# Patient Record
Sex: MALE | Race: WHITE
[De-identification: names, ages, dates, MRNs, and addresses within clinical notes are randomized per-mention and may not be internally consistent; named-entity substitution may affect disease eponyms.]

---

## 2021-05-10 ENCOUNTER — HOSPITAL ENCOUNTER (INPATIENT)
Dept: HOSPITAL 96 - M.ERS | Age: 76
LOS: 3 days | Discharge: HOME | DRG: 247 | End: 2021-05-13
Attending: INTERNAL MEDICINE | Admitting: INTERNAL MEDICINE
Payer: MEDICARE

## 2021-05-10 VITALS — DIASTOLIC BLOOD PRESSURE: 83 MMHG | SYSTOLIC BLOOD PRESSURE: 175 MMHG

## 2021-05-10 VITALS — BODY MASS INDEX: 32.93 KG/M2 | HEIGHT: 70 IN | WEIGHT: 230 LBS

## 2021-05-10 VITALS — DIASTOLIC BLOOD PRESSURE: 102 MMHG | SYSTOLIC BLOOD PRESSURE: 211 MMHG

## 2021-05-10 VITALS — SYSTOLIC BLOOD PRESSURE: 190 MMHG | DIASTOLIC BLOOD PRESSURE: 91 MMHG

## 2021-05-10 VITALS — DIASTOLIC BLOOD PRESSURE: 110 MMHG | SYSTOLIC BLOOD PRESSURE: 200 MMHG

## 2021-05-10 VITALS — DIASTOLIC BLOOD PRESSURE: 88 MMHG | SYSTOLIC BLOOD PRESSURE: 163 MMHG

## 2021-05-10 VITALS — DIASTOLIC BLOOD PRESSURE: 93 MMHG | SYSTOLIC BLOOD PRESSURE: 162 MMHG

## 2021-05-10 VITALS — SYSTOLIC BLOOD PRESSURE: 188 MMHG | DIASTOLIC BLOOD PRESSURE: 98 MMHG

## 2021-05-10 VITALS — DIASTOLIC BLOOD PRESSURE: 94 MMHG | SYSTOLIC BLOOD PRESSURE: 184 MMHG

## 2021-05-10 DIAGNOSIS — Z86.73: ICD-10-CM

## 2021-05-10 DIAGNOSIS — I10: ICD-10-CM

## 2021-05-10 DIAGNOSIS — Z82.49: ICD-10-CM

## 2021-05-10 DIAGNOSIS — F17.210: ICD-10-CM

## 2021-05-10 DIAGNOSIS — Z88.1: ICD-10-CM

## 2021-05-10 DIAGNOSIS — Z95.5: ICD-10-CM

## 2021-05-10 DIAGNOSIS — I73.9: ICD-10-CM

## 2021-05-10 DIAGNOSIS — I21.11: Primary | ICD-10-CM

## 2021-05-10 DIAGNOSIS — Z88.0: ICD-10-CM

## 2021-05-10 DIAGNOSIS — J45.909: ICD-10-CM

## 2021-05-10 DIAGNOSIS — F43.10: ICD-10-CM

## 2021-05-10 DIAGNOSIS — I95.9: ICD-10-CM

## 2021-05-10 DIAGNOSIS — Z95.820: ICD-10-CM

## 2021-05-10 LAB
ABSOLUTE MONOCYTES: 0.4 THOU/UL (ref 0–1.2)
ALBUMIN SERPL-MCNC: 4.1 G/DL (ref 3.4–5)
ALP SERPL-CCNC: 110 U/L (ref 46–116)
ALT SERPL-CCNC: 38 U/L (ref 30–65)
ANION GAP SERPL CALC-SCNC: 9 MMOL/L (ref 7–16)
APTT BLD: 31.3 SECONDS (ref 25–31.3)
AST SERPL-CCNC: 55 U/L (ref 15–37)
BILIRUB SERPL-MCNC: 0.7 MG/DL
BUN SERPL-MCNC: 8 MG/DL (ref 7–18)
CALCIUM SERPL-MCNC: 10 MG/DL (ref 8.5–10.1)
CHLORIDE SERPL-SCNC: 96 MMOL/L (ref 98–107)
CO2 SERPL-SCNC: 29 MMOL/L (ref 21–32)
CREAT SERPL-MCNC: 1 MG/DL (ref 0.6–1.3)
GLUCOSE SERPL-MCNC: 160 MG/DL (ref 70–99)
GRANULOCYTES NFR BLD MANUAL: 89 %
HCT VFR BLD CALC: 52.9 % (ref 42–52)
HGB BLD-MCNC: 18.3 GM/DL (ref 14–18)
INR PPP: 1
LIPASE: 133 U/L (ref 73–393)
LYMPHOCYTES # BLD: 1 THOU/UL (ref 0.8–5.3)
LYMPHOCYTES NFR BLD AUTO: 8 %
MAGNESIUM SERPL-MCNC: 1.7 MG/DL (ref 1.8–2.4)
MCH RBC QN AUTO: 34.2 PG (ref 26–34)
MCHC RBC AUTO-ENTMCNC: 34.5 G/DL (ref 28–37)
MCV RBC: 99.2 FL (ref 80–100)
MONOCYTES NFR BLD: 3 %
MPV: 10 FL. (ref 7.2–11.1)
NEUTROPHILS # BLD: 11 THOU/UL (ref 1.6–8.1)
NT-PRO BRAIN NAT PEPTIDE: 2624 PG/ML (ref ?–300)
NUCLEATED RBCS: 0 /100WBC
PLATELET # BLD EST: ADEQUATE 10*3/UL
PLATELET COUNT*: 257 THOU/UL (ref 150–400)
POTASSIUM SERPL-SCNC: 4 MMOL/L (ref 3.5–5.1)
PROT SERPL-MCNC: 8.1 G/DL (ref 6.4–8.2)
PROTHROMBIN TIME: 10.9 SECONDS (ref 9.2–11.5)
RBC # BLD AUTO: 5.33 MIL/UL (ref 4.5–6)
RBC MORPH BLD: NORMAL
RDW-CV: 13.2 % (ref 10.5–14.5)
SODIUM SERPL-SCNC: 134 MMOL/L (ref 136–145)
WBC # BLD AUTO: 12.4 THOU/UL (ref 4–11)

## 2021-05-10 PROCEDURE — B215YZZ FLUOROSCOPY OF LEFT HEART USING OTHER CONTRAST: ICD-10-PCS | Performed by: INTERNAL MEDICINE

## 2021-05-10 PROCEDURE — B41FYZZ FLUOROSCOPY OF RIGHT LOWER EXTREMITY ARTERIES USING OTHER CONTRAST: ICD-10-PCS | Performed by: INTERNAL MEDICINE

## 2021-05-10 PROCEDURE — B211YZZ FLUOROSCOPY OF MULTIPLE CORONARY ARTERIES USING OTHER CONTRAST: ICD-10-PCS | Performed by: INTERNAL MEDICINE

## 2021-05-10 PROCEDURE — 027034Z DILATION OF CORONARY ARTERY, ONE ARTERY WITH DRUG-ELUTING INTRALUMINAL DEVICE, PERCUTANEOUS APPROACH: ICD-10-PCS | Performed by: INTERNAL MEDICINE

## 2021-05-10 PROCEDURE — 4A023N7 MEASUREMENT OF CARDIAC SAMPLING AND PRESSURE, LEFT HEART, PERCUTANEOUS APPROACH: ICD-10-PCS | Performed by: INTERNAL MEDICINE

## 2021-05-11 VITALS — DIASTOLIC BLOOD PRESSURE: 54 MMHG | SYSTOLIC BLOOD PRESSURE: 138 MMHG

## 2021-05-11 VITALS — DIASTOLIC BLOOD PRESSURE: 41 MMHG | SYSTOLIC BLOOD PRESSURE: 89 MMHG

## 2021-05-11 VITALS — DIASTOLIC BLOOD PRESSURE: 65 MMHG | SYSTOLIC BLOOD PRESSURE: 133 MMHG

## 2021-05-11 VITALS — SYSTOLIC BLOOD PRESSURE: 85 MMHG | DIASTOLIC BLOOD PRESSURE: 48 MMHG

## 2021-05-11 VITALS — SYSTOLIC BLOOD PRESSURE: 132 MMHG | DIASTOLIC BLOOD PRESSURE: 55 MMHG

## 2021-05-11 VITALS — SYSTOLIC BLOOD PRESSURE: 121 MMHG | DIASTOLIC BLOOD PRESSURE: 56 MMHG

## 2021-05-11 VITALS — SYSTOLIC BLOOD PRESSURE: 133 MMHG | DIASTOLIC BLOOD PRESSURE: 78 MMHG

## 2021-05-11 VITALS — DIASTOLIC BLOOD PRESSURE: 80 MMHG | SYSTOLIC BLOOD PRESSURE: 131 MMHG

## 2021-05-11 VITALS — SYSTOLIC BLOOD PRESSURE: 84 MMHG | DIASTOLIC BLOOD PRESSURE: 45 MMHG

## 2021-05-11 VITALS — SYSTOLIC BLOOD PRESSURE: 100 MMHG | DIASTOLIC BLOOD PRESSURE: 54 MMHG

## 2021-05-11 VITALS — SYSTOLIC BLOOD PRESSURE: 132 MMHG | DIASTOLIC BLOOD PRESSURE: 58 MMHG

## 2021-05-11 VITALS — SYSTOLIC BLOOD PRESSURE: 99 MMHG | DIASTOLIC BLOOD PRESSURE: 46 MMHG

## 2021-05-11 VITALS — DIASTOLIC BLOOD PRESSURE: 57 MMHG | SYSTOLIC BLOOD PRESSURE: 121 MMHG

## 2021-05-11 VITALS — SYSTOLIC BLOOD PRESSURE: 90 MMHG | DIASTOLIC BLOOD PRESSURE: 48 MMHG

## 2021-05-11 VITALS — SYSTOLIC BLOOD PRESSURE: 105 MMHG | DIASTOLIC BLOOD PRESSURE: 52 MMHG

## 2021-05-11 VITALS — SYSTOLIC BLOOD PRESSURE: 82 MMHG | DIASTOLIC BLOOD PRESSURE: 46 MMHG

## 2021-05-11 VITALS — DIASTOLIC BLOOD PRESSURE: 53 MMHG | SYSTOLIC BLOOD PRESSURE: 117 MMHG

## 2021-05-11 VITALS — DIASTOLIC BLOOD PRESSURE: 39 MMHG | SYSTOLIC BLOOD PRESSURE: 99 MMHG

## 2021-05-11 VITALS — SYSTOLIC BLOOD PRESSURE: 126 MMHG | DIASTOLIC BLOOD PRESSURE: 73 MMHG

## 2021-05-11 VITALS — SYSTOLIC BLOOD PRESSURE: 123 MMHG | DIASTOLIC BLOOD PRESSURE: 54 MMHG

## 2021-05-11 VITALS — DIASTOLIC BLOOD PRESSURE: 57 MMHG | SYSTOLIC BLOOD PRESSURE: 112 MMHG

## 2021-05-11 VITALS — SYSTOLIC BLOOD PRESSURE: 116 MMHG | DIASTOLIC BLOOD PRESSURE: 51 MMHG

## 2021-05-11 VITALS — SYSTOLIC BLOOD PRESSURE: 127 MMHG | DIASTOLIC BLOOD PRESSURE: 64 MMHG

## 2021-05-11 VITALS — SYSTOLIC BLOOD PRESSURE: 90 MMHG | DIASTOLIC BLOOD PRESSURE: 45 MMHG

## 2021-05-11 VITALS — SYSTOLIC BLOOD PRESSURE: 87 MMHG | DIASTOLIC BLOOD PRESSURE: 40 MMHG

## 2021-05-11 VITALS — SYSTOLIC BLOOD PRESSURE: 115 MMHG | DIASTOLIC BLOOD PRESSURE: 54 MMHG

## 2021-05-11 VITALS — DIASTOLIC BLOOD PRESSURE: 44 MMHG | SYSTOLIC BLOOD PRESSURE: 83 MMHG

## 2021-05-11 VITALS — SYSTOLIC BLOOD PRESSURE: 106 MMHG | DIASTOLIC BLOOD PRESSURE: 41 MMHG

## 2021-05-11 VITALS — SYSTOLIC BLOOD PRESSURE: 103 MMHG | DIASTOLIC BLOOD PRESSURE: 45 MMHG

## 2021-05-11 VITALS — DIASTOLIC BLOOD PRESSURE: 62 MMHG | SYSTOLIC BLOOD PRESSURE: 116 MMHG

## 2021-05-11 VITALS — SYSTOLIC BLOOD PRESSURE: 112 MMHG | DIASTOLIC BLOOD PRESSURE: 48 MMHG

## 2021-05-11 VITALS — DIASTOLIC BLOOD PRESSURE: 56 MMHG | SYSTOLIC BLOOD PRESSURE: 107 MMHG

## 2021-05-11 VITALS — DIASTOLIC BLOOD PRESSURE: 44 MMHG | SYSTOLIC BLOOD PRESSURE: 92 MMHG

## 2021-05-11 VITALS — DIASTOLIC BLOOD PRESSURE: 39 MMHG | SYSTOLIC BLOOD PRESSURE: 103 MMHG

## 2021-05-11 VITALS — SYSTOLIC BLOOD PRESSURE: 78 MMHG | DIASTOLIC BLOOD PRESSURE: 45 MMHG

## 2021-05-11 VITALS — DIASTOLIC BLOOD PRESSURE: 47 MMHG | SYSTOLIC BLOOD PRESSURE: 108 MMHG

## 2021-05-11 VITALS — SYSTOLIC BLOOD PRESSURE: 78 MMHG | DIASTOLIC BLOOD PRESSURE: 43 MMHG

## 2021-05-11 VITALS — DIASTOLIC BLOOD PRESSURE: 52 MMHG | SYSTOLIC BLOOD PRESSURE: 96 MMHG

## 2021-05-11 VITALS — DIASTOLIC BLOOD PRESSURE: 48 MMHG | SYSTOLIC BLOOD PRESSURE: 150 MMHG

## 2021-05-11 VITALS — DIASTOLIC BLOOD PRESSURE: 55 MMHG | SYSTOLIC BLOOD PRESSURE: 131 MMHG

## 2021-05-11 VITALS — DIASTOLIC BLOOD PRESSURE: 74 MMHG | SYSTOLIC BLOOD PRESSURE: 143 MMHG

## 2021-05-11 VITALS — DIASTOLIC BLOOD PRESSURE: 76 MMHG | SYSTOLIC BLOOD PRESSURE: 159 MMHG

## 2021-05-11 VITALS — SYSTOLIC BLOOD PRESSURE: 112 MMHG | DIASTOLIC BLOOD PRESSURE: 50 MMHG

## 2021-05-11 VITALS — DIASTOLIC BLOOD PRESSURE: 63 MMHG | SYSTOLIC BLOOD PRESSURE: 118 MMHG

## 2021-05-11 VITALS — SYSTOLIC BLOOD PRESSURE: 135 MMHG | DIASTOLIC BLOOD PRESSURE: 56 MMHG

## 2021-05-11 VITALS — DIASTOLIC BLOOD PRESSURE: 35 MMHG | SYSTOLIC BLOOD PRESSURE: 89 MMHG

## 2021-05-11 VITALS — SYSTOLIC BLOOD PRESSURE: 122 MMHG | DIASTOLIC BLOOD PRESSURE: 58 MMHG

## 2021-05-11 VITALS — DIASTOLIC BLOOD PRESSURE: 38 MMHG | SYSTOLIC BLOOD PRESSURE: 86 MMHG

## 2021-05-11 VITALS — DIASTOLIC BLOOD PRESSURE: 59 MMHG | SYSTOLIC BLOOD PRESSURE: 123 MMHG

## 2021-05-11 VITALS — SYSTOLIC BLOOD PRESSURE: 105 MMHG | DIASTOLIC BLOOD PRESSURE: 42 MMHG

## 2021-05-11 VITALS — SYSTOLIC BLOOD PRESSURE: 128 MMHG | DIASTOLIC BLOOD PRESSURE: 67 MMHG

## 2021-05-11 VITALS — SYSTOLIC BLOOD PRESSURE: 99 MMHG | DIASTOLIC BLOOD PRESSURE: 45 MMHG

## 2021-05-11 VITALS — DIASTOLIC BLOOD PRESSURE: 49 MMHG | SYSTOLIC BLOOD PRESSURE: 91 MMHG

## 2021-05-11 VITALS — DIASTOLIC BLOOD PRESSURE: 71 MMHG | SYSTOLIC BLOOD PRESSURE: 131 MMHG

## 2021-05-11 VITALS — DIASTOLIC BLOOD PRESSURE: 42 MMHG | SYSTOLIC BLOOD PRESSURE: 88 MMHG

## 2021-05-11 VITALS — SYSTOLIC BLOOD PRESSURE: 107 MMHG | DIASTOLIC BLOOD PRESSURE: 88 MMHG

## 2021-05-11 VITALS — DIASTOLIC BLOOD PRESSURE: 39 MMHG | SYSTOLIC BLOOD PRESSURE: 95 MMHG

## 2021-05-11 VITALS — DIASTOLIC BLOOD PRESSURE: 68 MMHG | SYSTOLIC BLOOD PRESSURE: 134 MMHG

## 2021-05-11 VITALS — DIASTOLIC BLOOD PRESSURE: 53 MMHG | SYSTOLIC BLOOD PRESSURE: 111 MMHG

## 2021-05-11 VITALS — SYSTOLIC BLOOD PRESSURE: 136 MMHG | DIASTOLIC BLOOD PRESSURE: 67 MMHG

## 2021-05-11 VITALS — SYSTOLIC BLOOD PRESSURE: 121 MMHG | DIASTOLIC BLOOD PRESSURE: 57 MMHG

## 2021-05-11 VITALS — SYSTOLIC BLOOD PRESSURE: 103 MMHG | DIASTOLIC BLOOD PRESSURE: 41 MMHG

## 2021-05-11 VITALS — DIASTOLIC BLOOD PRESSURE: 35 MMHG | SYSTOLIC BLOOD PRESSURE: 100 MMHG

## 2021-05-11 VITALS — DIASTOLIC BLOOD PRESSURE: 57 MMHG | SYSTOLIC BLOOD PRESSURE: 120 MMHG

## 2021-05-11 VITALS — SYSTOLIC BLOOD PRESSURE: 94 MMHG | DIASTOLIC BLOOD PRESSURE: 43 MMHG

## 2021-05-11 VITALS — DIASTOLIC BLOOD PRESSURE: 38 MMHG | SYSTOLIC BLOOD PRESSURE: 101 MMHG

## 2021-05-11 VITALS — SYSTOLIC BLOOD PRESSURE: 129 MMHG | DIASTOLIC BLOOD PRESSURE: 71 MMHG

## 2021-05-11 VITALS — SYSTOLIC BLOOD PRESSURE: 169 MMHG | DIASTOLIC BLOOD PRESSURE: 79 MMHG

## 2021-05-11 VITALS — DIASTOLIC BLOOD PRESSURE: 77 MMHG | SYSTOLIC BLOOD PRESSURE: 149 MMHG

## 2021-05-11 VITALS — DIASTOLIC BLOOD PRESSURE: 51 MMHG | SYSTOLIC BLOOD PRESSURE: 120 MMHG

## 2021-05-11 VITALS — SYSTOLIC BLOOD PRESSURE: 106 MMHG | DIASTOLIC BLOOD PRESSURE: 47 MMHG

## 2021-05-11 VITALS — SYSTOLIC BLOOD PRESSURE: 140 MMHG | DIASTOLIC BLOOD PRESSURE: 58 MMHG

## 2021-05-11 VITALS — DIASTOLIC BLOOD PRESSURE: 50 MMHG | SYSTOLIC BLOOD PRESSURE: 128 MMHG

## 2021-05-11 VITALS — SYSTOLIC BLOOD PRESSURE: 137 MMHG | DIASTOLIC BLOOD PRESSURE: 42 MMHG

## 2021-05-11 LAB
ALBUMIN SERPL-MCNC: 3.7 G/DL (ref 3.4–5)
ALP SERPL-CCNC: 108 U/L (ref 46–116)
ALT SERPL-CCNC: 68 U/L (ref 30–65)
ANION GAP SERPL CALC-SCNC: 10 MMOL/L (ref 7–16)
AST SERPL-CCNC: 291 U/L (ref 15–37)
BILIRUB SERPL-MCNC: 0.9 MG/DL
BUN SERPL-MCNC: 8 MG/DL (ref 7–18)
CALCIUM SERPL-MCNC: 10.7 MG/DL (ref 8.5–10.1)
CHLORIDE SERPL-SCNC: 99 MMOL/L (ref 98–107)
CHOLEST SERPL-MCNC: 255 MG/DL (ref ?–200)
CO2 SERPL-SCNC: 26 MMOL/L (ref 21–32)
CREAT SERPL-MCNC: 1 MG/DL (ref 0.6–1.3)
GLUCOSE SERPL-MCNC: 140 MG/DL (ref 70–99)
HCT VFR BLD CALC: 48.8 % (ref 42–52)
HDLC SERPL-MCNC: 40 MG/DL (ref 40–?)
HGB BLD-MCNC: 16.8 GM/DL (ref 14–18)
LDLC SERPL-MCNC: 142 MG/DL (ref ?–100)
MCH RBC QN AUTO: 33.7 PG (ref 26–34)
MCHC RBC AUTO-ENTMCNC: 34.4 G/DL (ref 28–37)
MCV RBC: 98.1 FL (ref 80–100)
MPV: 9.6 FL. (ref 7.2–11.1)
PLATELET COUNT*: 243 THOU/UL (ref 150–400)
POTASSIUM SERPL-SCNC: 4.7 MMOL/L (ref 3.5–5.1)
PROT SERPL-MCNC: 7.1 G/DL (ref 6.4–8.2)
RBC # BLD AUTO: 4.98 MIL/UL (ref 4.5–6)
RDW-CV: 13.2 % (ref 10.5–14.5)
SODIUM SERPL-SCNC: 135 MMOL/L (ref 136–145)
TC:HDL: 6.4 RATIO
TRIGL SERPL-MCNC: 365 MG/DL (ref ?–150)
VLDLC SERPL CALC-MCNC: 73 MG/DL (ref ?–40)
WBC # BLD AUTO: 14.2 THOU/UL (ref 4–11)

## 2021-05-11 NOTE — CARD
[address]  [address]                    CARDIAC CATH REPORT           
_______________________________________________________________________________
 
Name:       CLINT BOSS JR             Room:           [address] [place]  
[facility]#:  [id number]      Account #:      [id number]  
Admission:  05/10/21     Attend Phys:    SHAE Ragdsale
Discharge:               Date of Birth:  [birth year]  
         Report #: [id number]
                                                                     [id number]
_______________________________________________________________________________
THIS REPORT FOR:  
 
cc:  Tommy Gibson MD, Matthew W. MD Holkins, John M. MD [facility]                                           ~
 
 
--------------- APPROVED REPORT --------------
 
 
Study performed:  05/10/2021 19:07:51
 
Patient Details
Patient Status: ED                  Room #: 
The patient is a 75 year-old male
 
Event Personnel
Arthur Yuan  Cardiologist, Zayra Wei RN CirculatorPaty Brad RCIS Monitor, Navi Kelly RCIS Scrub
 
Procedures Performed
Art Access - R femoral artery*  Left Heart Cath w/or w/o Coronaries 
[id number] [facility] JANICE Place w/wo Plasty Single CIRC 
[id number]
 
Indication
STEMI 
 
Risk Factors
Obesity, Hypercholesterolemia, Hypertension
 
Procedure Narrative
The patient was brought emergently to the Cardiac Catheterization 
Laboratory and was prepped and draped in a sterile manner. The right 
femoral was infiltrated with 2% Lidocaine subcutaneous anesthesia. IV 
conscious sedation was used throughout procedure with appropriate 
monitoring and was performed in the presence of a registered nurse 
who was an independent trained observer other than the physician 
performing the procedure. A [place] 6 FR sheath was inserted into 
the right femoral artery. Coronary angiography was performed using 
coronary diagnostic catheters. The right coronary system was accessed 
and visualized with a JR4 catheter. The left coronary system was 
accessed and visualized with a JL4 catheter. Left ventriculogram was 
performed in FELIPE projection. Pre-demployment femoral angiogram was 
performed . Closure device was deployed with a 6 Fr Angioseal. The 
patient tolerated the procedure well and there were no complications 
 
 
 
[place] [address]                    CARDIAC CATH REPORT           
_______________________________________________________________________________
 
Name:       CLINT BOSS JR             Room:           [address] [place]  
[facility]#:  [id number]      Account #:      [id number]  
Admission:  05/10/21     Attend Phys:    SHAE Ragsdale
Discharge:               Date of Birth:  [birth year]  
         Report #: [id number]
                                                                     [id number]
_______________________________________________________________________________
 
associated with the procedure. There was no hematoma.
 
Intraoperative Conscious Sedation
Sedation start time:  1941           Case end Time:  2035   
 
Fluoro Time:    22.2 minutes     
Dose:     DAP 85454 cGycm2  3069 mGy  
Contrast Type and Amount:  Visipaque 360 ml    
 
Diagnostic Cath
Left Main 0% narrowing
LAD  40% ostial and proximal narrowing
Circumflex 100% mid vessel occlusion with prominent intraluminal 
thrombus and ANGELA 0 flow to the distal vessel
Right Coronary Dominant vessel with 50% narrowing at the acute 
margin
 
Left Ventriculography
Left Ventriculography was not performed.     
 
Hemodynamics
The aortic pressure is 219/93 mmHg with a mean of 137 mmHg. 
 
PCI Technique Lesion
Anticoagulation was achieved with Angiomax. Patient was preloaded 
with Angiomax IV 16 ml. Percutaneous coronary intervention was 
performed on the mid circumflex artery segment. The lesion stenosis 
prior to intervention was 100% with ANGELA 0 flow. A JR4 Guide Catheter 
was used to engage the RIGHT ostium.
 
BALLOON DILATION
A Balloon catheter NC Euphora 2.25x 12 was inserted and inflated up 
to 13.00atm for 11seconds. Additional Inflation: 14.00atm for 
7seconds.
 
STENT DEPLOYMENT
A drug-eluting stent Yoni RX Stent  2.5X15mm was inserted and 
inflated up to 12.00atm for 7seconds. Additional Inflation: 14.00atm 
for 7seconds. Additional Inflation: 16.00atm for 13seconds.
 
POST STENT DEPLOYMENT BALLOON DILATION
A Balloon catheter NC Trek RX 3.0 X 12 was inserted and inflated up 
to 12.00atm for 16seconds. Additional Inflation: 15.00atm for 
7seconds. Additional Inflation: 15.00atm for 8seconds.
 
 
 
 
[place] [address]                    CARDIAC CATH REPORT           
_______________________________________________________________________________
 
Name:       CLINT BOSS JR             Room:           [address] [place]  
[facility]#:  [id number]      Account #:      [id number]  
Admission:  05/10/21     Attend Phys:    SHAE Ragsdale
Discharge:               Date of Birth:  [birth year]  
         Report #: [id number]
                                                                     [id number]
_______________________________________________________________________________
 
Final angiography reveals 10 % stenosis with ANGELA 3 
flow.
 
COMMENTS
The PCI was technically complex by virtue of difficult access with 
complex wiring from the femoral approach as I had to traverse stent 
grafts in the right common iliac and abdominal aorta.  This was 
accomplished prior to proceeding with angiographic assessment and PCI 
to the circumflex.
 
Conclusion
1.  Acute ST segment elevation inferoposterior myocardial 
infarction
 
2.  Significant coronary artery disease characterized by the 
following:
 
A 100% mid circumflex occlusion with local thrombus at the site of 
ANGELA 0 flow to the distal vessel
 
B 40% ostial and proximal LAD narrowing
 
C dominant right coronary artery with 50% narrowing at the acute 
margin
 
3.  Significant systemic hypertension throughout the study
 
4.  Successful PCI with deployment of drug-eluting stent at the site 
of 100% mid circumflex occlusion with 10% residual narrowing ANGELA-3 
flow the distal vessel and no residual thrombus 
 
Recommendations
Cardiac Risk Reduction Program
Aggressive Medical Therapy
 
Medications Administered
Aspirin (any) 
Ticagrelor 
 
Diagnostic Cath Approved by: Arthur Yuan MD        Date/Time: 
05/11/2021 11:49:44
 
 
<ELECTRONICALLY SIGNED>
                                        By:  Arthur Yuan MD, [facility]     
05/11/21     1150
D: 05/11/21 1150_______________________________________
T: 05/11/21 1150Arthur Yuan MD, FAC        /INF

## 2021-05-11 NOTE — EKG
[place] [address]
Phone:  [phone]                     ELECTROCARDIOGRAM REPORT      
_______________________________________________________________________________
 
Name:         CLINT BOSS JR            Room:          [address]    ADM [place] 
M.R.#:    [id number]     Account #:     [id number]  
Admission:    05/10/21    Attend Phys:   Kaushal Ernst
Discharge:                Date of Birth: [birth year]  
Date of Service: 05/10/21 2346  Report #:      [id number]
        [id number]
_______________________________________________________________________________
THIS REPORT FOR:  //name//                      
 
                          [facility]
                                       
Test Date:    2021-05-10               Test Time:    23:46:05
Pat Name:     CLINT BOSS            Department:   
Patient ID:   [id number]            Room:         [facility]
Gender:       M                        Technician:   [id number]
:          1945               Requested By: Carter Zuniga
Order Number: [id number] MD:   Arthur Yuan
                                 Measurements
Intervals                              Axis          
Rate:         72                       P:            45
NJ:           162                      QRS:          -74
QRSD:         94                       T:            0
QT:           380                                    
QTc:          416                                    
                           Interpretive Statements
Sinus rhythm
Abnormal R-wave progression, early transition
Inferior posterior infarct involving
Compared to ECG 04/15/2016 09:27:53
Myocardial infarct finding now present
Electronically Signed On 2021 12:52:16 CDT by Arthur Yuan
[email or web address]
 
 
 
 
 
 
 
 
 
 
 
 
 
 
 
 
 
 
 
  <ELECTRONICALLY SIGNED>
                                           By: Arthur Yuan MD, FAC     
  21     1252
D: 05/10/21 2346   _____________________________________
T: 05/10/21 2346   Arthur Yuan MD, FAC       /EPI

## 2021-05-11 NOTE — EKG
[place] [address]
Phone:  [phone]                     ELECTROCARDIOGRAM REPORT      
_______________________________________________________________________________
 
Name:         CLINT BOSS JR            Room:          [address]    ADM [place] 
M.R.#:    [id number]     Account #:     [id number]  
Admission:    05/10/21    Attend Phys:   Kaushal Ernst
Discharge:                Date of Birth: [birth year]  
Date of Service: 05/10/21 1844  Report #:      [id number]
        [id number]
_______________________________________________________________________________
THIS REPORT FOR:  //name//                      
 
                         [facility] ED
                                       
Test Date:    2021-05-10               Test Time:    18:44:36
Pat Name:     CLINT BOSS            Department:   
Patient ID:   [id number]            Room:         [facility]
Gender:       M                        Technician:   JAVIER
:          1945               Requested By: Carter Zuniga
Order Number: [id number] MD:   Arthur Yuan
                                 Measurements
Intervals                              Axis          
Rate:         99                       P:            68
IL:           205                      QRS:          -61
QRSD:         91                       T:            80
QT:           344                                    
QTc:          442                                    
                           Interpretive Statements
Sinus rhythm
Probable left atrial enlargement
Inferoposterior infarct, acute 
Probable RV involvement, suggest recording right precordial leads
Compared to ECG 04/15/2016 09:27:53
Myocardial infarct finding now present
Electronically Signed On 2021 12:48:43 CDT by Arthur Yuan
[email or web address]
 
 
 
 
 
 
 
 
 
 
 
 
 
 
 
 
 
 
  <ELECTRONICALLY SIGNED>
                                           By: Arthur Yuan MD, [facility]     
  21     1248
D: 05/10/21 1844   _____________________________________
T: 05/10/21 1844   Arthur Yuan MD, [facility]       /EPI

## 2021-05-11 NOTE — 2DMMODE
[place] [address]
Phone:  [phone]                     2 D/M-MODE ECHOCARDIOGRAM     
_______________________________________________________________________________
 
Name:         CLINT BOSS JR            Room:          [address] [place] 
.R.#:    [id number]     Account #:     [id number]  
Admission:    05/10/21    Attend Phys:   Kaushal Ersnt
Discharge:                Date of Birth: [birth year]  
Date of Service: 21 1242  Report #:      [id number]
        [id number]
_______________________________________________________________________________
THIS REPORT FOR:
 
cc:  Tommy Gibson MD, Matthew W. MD Holkins, John M. MD [facility]       
                                                                       ~
 
--------------- APPROVED REPORT --------------
 
 
Study performed:  2021 09:55:50
 
EXAM: Comprehensive 2D, Doppler, and color-flow 
Echocardiogram 
Patient Location: Bedside   
 
      BSA:         2.21
HR: 74 bpm BP:          112/50 mmHg 
 
Other Information 
Study Quality: Good
 
Indications
Chest Pain
MI
 
2D Dimensions
IVSd:  13.80 (7-11mm) LVOT Diam:  22.27 (18-24mm) 
LVDd:  46.70 mm  
PWd:  11.89 (7-11mm) Ascending Ao:  33.03 (22-36mm)
LVDs:  29.81 (25-40mm) 
Aortic Root:  33.65 mm 
 
Volumes
Left Atrial Volume (Systole) 
    LA ESV Index:  17.60 mL/m2
 
Aortic Valve
AoV Peak Estevan.:  1.45 m/s 
AO Peak Gr.:  8.36 mmHg  LVOT Max PG:  3.79 mmHg
AO Mean Gr.:  4.65 mmHg  LVOT Mean P.88 mmHg
    LVOT Max V:  0.97 m/s
AO V2 VTI:  27.86 cm  LVOT Mean V:  0.63 m/s
JACOB (VTI):  3.50 cm2  LVOT V1 VTI:  25.04 cm
 
Mitral Valve
 
 
[place] [address]
Phone:  [phone]                     2 D/M-MODE ECHOCARDIOGRAM     
_______________________________________________________________________________
 
Name:         CLINT BOSS JR            Room:          [address]..#:    [id number]     Account #:     [id number]  
Admission:    05/10/21    Attend Phys:   Kaushal Ernst
Discharge:                Date of Birth: [birth year]  
Date of Service: 21 1242  Report #:      [id number]
        [id number]
_______________________________________________________________________________
    E/A Ratio:  0.75
    MV Decel. Time:  273.50 ms
MV E Max Estevan.:  0.67 m/s 
MV PHT:  79.31 ms  
MVA (PHT):  2.77 cm2  
 
TDI
E/Lateral E':  13.40 E/Medial E':  9.57
   Medial E' Estevan.:  0.07 m/s
   Lateral E' Estevan.:  0.05 m/s
 
Pulmonary Valve
PV Peak Estevan.:  0.85 m/s PV Peak Gr.:  2.87 mmHg
 
Left Ventricle
The left ventricle is normal size. There is focal inferolateral 
hypokinesis. Mild concentric left ventricular hypertrophy. Left 
ventricular systolic function is normal. The left ventricular 
ejection fraction is within the normal range. LVEF is 60%. Grade I - 
abnormal relaxation pattern.
 
Right Ventricle
The right ventricle is normal size. The right ventricular systolic 
function is normal.
 
Atria
The left atrium size is normal. The right atrium size is 
normal.
 
Aortic Valve
Mild aortic valve sclerosis. No aortic regurgitation is present. 
There is no aortic valvular stenosis.
 
Mitral Valve
Mild mitral annular calcification. There is no mitral valve 
regurgitation noted. No evidence of mitral valve stenosis.
 
Tricuspid Valve
The tricuspid valve is normal in structure. There is no tricuspid 
valve regurgitation noted.
 
Pulmonic Valve
The pulmonary valve is normal in structure. There is no pulmonic 
valvular regurgitation.
 
[employer]
 
 
[place] [address]
Phone:  [phone]                     2 D/M-MODE ECHOCARDIOGRAM     
_______________________________________________________________________________
 
Name:         CLINT BOSS JR            Room:          [address]#:    [id number]     Account #:     [id number]  
Admission:    05/10/21    Attend Phys:   Kaushal Ernst
Discharge:                Date of Birth: [birth year]  
Date of Service: 21 1242  Report #:      [id number]
        [id number]
_______________________________________________________________________________
The aortic root is normal in size. IVC is normal in size and 
collapses >50% with inspiration.
 
Pericardium
There is no pericardial effusion.
 
<Conclusion>
The left ventricle is normal size.
Mild concentric left ventricular hypertrophy.
Left ventricular systolic function is normal.
The left ventricular ejection fraction is within the normal 
range.
LVEF is 60%.
Grade I - abnormal relaxation pattern.
The right ventricle is normal size.
The left atrium size is normal.
Mild aortic valve sclerosis.
No aortic regurgitation is present.
Mild mitral annular calcification.
There is no mitral valve regurgitation noted.
The tricuspid valve is normal in structure.
IVC is normal in size and collapses >50% with inspiration.
There is no pericardial effusion.
There is focal inferolateral hypokinesis.
 
 
 
 
 
 
 
 
 
 
 
 
 
 
 
 
 
 
 
 
  <ELECTRONICALLY SIGNED>
                                           [facility]: Arthur [facility] MD, [facility]     
  21     124
D: 21 124   _____________________________________
T: 21   Arthur Yuan MD, FACC       /INF

## 2021-05-11 NOTE — EKG
[place] [address]
Phone:  [phone]                     ELECTROCARDIOGRAM REPORT      
_______________________________________________________________________________
 
Name:         CLINT BOSS JR            Room:          [address]    ADM [place] 
M.R.#:    [id number]     Account #:     [id number]  
Admission:    05/10/21    Attend Phys:   Kaushal Ernst
Discharge:                Date of Birth: [birth year]  
Date of Service: 05/10/21 1855  Report #:      [id number]
        [id number]
_______________________________________________________________________________
THIS REPORT FOR:  //name//                      
 
                         [facility] ED
                                       
Test Date:    2021-05-10               Test Time:    18:55:51
Pat Name:     CLINT BOSS            Department:   
Patient ID:   [id number]            Room:         [address]
Gender:       M                        Technician:   JAVIER
:          1945               Requested By: Arthur Yuan
Order Number: [id number]    Carmencita MD:   Arthur Yuan
                                 Measurements
Intervals                              Axis          
Rate:         98                       P:            69
ND:           205                      QRS:          -54
QRSD:         94                       T:            79
QT:           355                                    
QTc:          454                                    
                           Interpretive Statements
Sinus rhythm
Inferoposterior infarct, acute 
Abnormal lateral Q waves
Probable RV involvement, suggest recording right precordial leads
Compared to ECG 05/10/2021 18:44:36
Q waves now present
Myocardial infarct finding still present
Electronically Signed On 2021 12:49:06 CDT by Arthur Yuan
[email or web address]
 
 
 
 
 
 
 
 
 
 
 
 
 
 
 
 
 
  <ELECTRONICALLY SIGNED>
                                           By: Arthur Yuan MD, [facility]     
  21     1249
D: 05/10/21 1855   _____________________________________
T: 05/10/21 1855   Arthur Yuan MD, [facility]       /EPI

## 2021-05-11 NOTE — CON
[address]  [address]                    CONSULTATION                  
_______________________________________________________________________________
 
Name:       GONZÁLEZUGOCLINT JR             Room:           [address] [place]  
.R.#:  [id number]      Account #:      [id number]  
Admission:  05/10/21     Attend Phys:    SHAE Ragsdale
Discharge:               Date of Birth:  [birth year]  
         Report #: [id number]
                                                                     [id number]
_______________________________________________________________________________
THIS REPORT FOR:  
 
cc:  Tommy Gibson MD, Matthew W. MD Holkins, John M. MD [facility]                                           ~
 
 
DOC #: [id number]
Arthur Yuan MD [facility]
DATE OF CONSULTATION: 05/10/2021
 
CARDIOLOGY CONSULTATION
 
The patient is going to the ICU on 05/10/2021.
 
HISTORY OF PRESENT ILLNESS:  The patient is a pleasant 75-year-old male with a 
history of peripheral arterial disease, hypertension and tobacco habituation.  
This afternoon, he developed severe central chest pain, which persisted and he 
ultimately sought assistance in the [facility] Emergency Room.  
EKG revealed evidence for acute inferoposterior ST-segment elevation myocardial 
infarction.
 
He had hypertension in the ER and the persistent pain.  When I saw him, he was 
continued to have discomfort and an EKG revealed acute inferoposterior injury.  
In that context, we transferred him emergently to the cath lab.
 
PHYSICAL EXAMINATION:
GENERAL:  Revealed an acutely distressed, overweight elderly male.
VITAL SIGNS:  Blood pressure 200/100, pulse rate was 108, respirations were 18 
per minute.
NECK:  Jugular venous pressure was normal.
CHEST:  Clear.
CARDIOVASCULAR:  Revealed normal first and second heart sounds with a soft 
systolic murmur.
ABDOMEN:  Obese.
EXTREMITIES:  Mildly edematous with intact femoral, pedal and radial pulses.
 
EKG revealed acute inferoposterior ST-segment elevation myocardial infarction.
 
ASSESSMENT:
1.  Acute inferoposterior ST-segment elevation myocardial infarction.
2.  Peripheral arterial disease, status post stent grafting of the aorta.
3.  Status post carotid endarterectomy.
4.  History of cerebrovascular accident.
5.  Hypertension.
6.  Tobacco habituation.
 
 
 
[place] [address]                    CONSULTATION                  
_______________________________________________________________________________
 
Name:       CLINT BOSS JR             Room:           [address]#:  [id number]      Account #:      [id number]  
Admission:  05/10/21     Attend Phys:    SHAE Ragsdale
Discharge:               Date of Birth:  [birth year]  
         Report #: [id number]
                                                                     [id number]
_______________________________________________________________________________
 
 
 
RECOMMENDATIONS:  Emergent cardiac catheterization was undertaken, which 
revealed total occlusion of the mid circumflex.  There was a 50% narrowing of 
the right coronary artery at the acute margin and 30% mid LAD narrowing.  This 
was accomplished through the stent graft, which had been placed in the aorta 
from the femoral approach.
 
I proceeded with acute PCI, deploying one 2.5 x 15 mm Yoni drug-eluting stent in
the mid circumflex and post-dilating with a 3.0 x 12 NC Trek, inflated to 12-16 
atmospheres with 10% residual narrowing, ANGELA 3 flow to the distal vessel.  His 
chest pain remitted and the acute EKG changes also remitted.
 
He was given Angiomax during the case and continued infusion 2 hours 
post-procedurally.
 
He was also placed on aspirin, Brilinta, beta blockade, ACE inhibition and 
statin.
 
CRITICAL CARE TIME:  Forty minutes from 8:20 p.m. to 9:00 p.m.
 
Arthur Yuan MD [facility]
 
D: 05/10/2021 09:01 PM
T: 05/10/2021 10:22 PM
 
 
 
 
 
 
 
 
 
 
 
 
 
 
 
 
 
<ELECTRONICALLY SIGNED>
                                        By:  Arthur Yuan MD, [facility]     
05/11/21     1302
D: 05/10/21 2001_______________________________________
T: 05/10/21 2122Arthur Yuan MD, [facility]        /nt

## 2021-05-11 NOTE — EKG
[place] [address]
Phone:  [phone]                     ELECTROCARDIOGRAM REPORT      
_______________________________________________________________________________
 
Name:         CLINT BOSS JR            Room:          [address]    ADM IN 
M.R.#:    [id number]     Account #:     [id number]  
Admission:    05/10/21    Attend Phys:   Kaushal Ernst
Discharge:                Date of Birth: [birth year]  
Date of Service: 21  Report #:      [id number]
        [id number]
_______________________________________________________________________________
THIS REPORT FOR:  //name//                      
 
                          [facility]
                                       
Test Date:    2021               Test Time:    06:22:02
Pat Name:     CLINT BOSS            Department:   
Patient ID:   [id number]            Room:         [address]
Gender:       M                        Technician:   [id number]
:          1945               Requested By: Arthur Yuan
Order Number: [id number]    Carmencita MD:   Arthur Yuan
                                 Measurements
Intervals                              Axis          
Rate:         67                       P:            43
MD:           159                      QRS:          -68
QRSD:         85                       T:            49
QT:           444                                    
QTc:          469                                    
                           Interpretive Statements
Sinus rhythm
Inferoposterior infarct, recent
ST depression V1-V3, suggest recording posterior leads
Baseline wander in lead(s) V6
Compared to ECG 05/10/2021 23:46:05
ST (T wave) deviation now present
Myocardial infarct finding still present
Electronically Signed On 2021 12:54:43 CDT by Arthur Yuan
[email or web address]
 
 
 
 
 
 
 
 
 
 
 
 
 
 
 
 
 
  <ELECTRONICALLY SIGNED>
                                           By: Arthur Yuan MD, [facility]     
  21     1254
D: 2122   _____________________________________
T: 21 0622   Arthur Yuan MD, [facility]       /EPI

## 2021-05-12 VITALS — DIASTOLIC BLOOD PRESSURE: 49 MMHG | SYSTOLIC BLOOD PRESSURE: 103 MMHG

## 2021-05-12 VITALS — SYSTOLIC BLOOD PRESSURE: 89 MMHG | DIASTOLIC BLOOD PRESSURE: 37 MMHG

## 2021-05-12 VITALS — SYSTOLIC BLOOD PRESSURE: 97 MMHG | DIASTOLIC BLOOD PRESSURE: 54 MMHG

## 2021-05-12 VITALS — SYSTOLIC BLOOD PRESSURE: 96 MMHG | DIASTOLIC BLOOD PRESSURE: 48 MMHG

## 2021-05-12 VITALS — DIASTOLIC BLOOD PRESSURE: 52 MMHG | SYSTOLIC BLOOD PRESSURE: 108 MMHG

## 2021-05-12 VITALS — DIASTOLIC BLOOD PRESSURE: 57 MMHG | SYSTOLIC BLOOD PRESSURE: 136 MMHG

## 2021-05-12 VITALS — SYSTOLIC BLOOD PRESSURE: 144 MMHG | DIASTOLIC BLOOD PRESSURE: 46 MMHG

## 2021-05-12 VITALS — SYSTOLIC BLOOD PRESSURE: 145 MMHG | DIASTOLIC BLOOD PRESSURE: 58 MMHG

## 2021-05-12 VITALS — DIASTOLIC BLOOD PRESSURE: 54 MMHG | SYSTOLIC BLOOD PRESSURE: 97 MMHG

## 2021-05-12 VITALS — SYSTOLIC BLOOD PRESSURE: 121 MMHG | DIASTOLIC BLOOD PRESSURE: 89 MMHG

## 2021-05-12 VITALS — SYSTOLIC BLOOD PRESSURE: 105 MMHG | DIASTOLIC BLOOD PRESSURE: 65 MMHG

## 2021-05-12 VITALS — DIASTOLIC BLOOD PRESSURE: 52 MMHG | SYSTOLIC BLOOD PRESSURE: 110 MMHG

## 2021-05-12 VITALS — SYSTOLIC BLOOD PRESSURE: 111 MMHG | DIASTOLIC BLOOD PRESSURE: 53 MMHG

## 2021-05-12 LAB
ALBUMIN SERPL-MCNC: 3.5 G/DL (ref 3.4–5)
ALP SERPL-CCNC: 91 U/L (ref 46–116)
ALT SERPL-CCNC: 56 U/L (ref 30–65)
ANION GAP SERPL CALC-SCNC: 8 MMOL/L (ref 7–16)
AST SERPL-CCNC: 105 U/L (ref 15–37)
BILIRUB SERPL-MCNC: 1 MG/DL
BUN SERPL-MCNC: 12 MG/DL (ref 7–18)
CALCIUM SERPL-MCNC: 9.5 MG/DL (ref 8.5–10.1)
CHLORIDE SERPL-SCNC: 104 MMOL/L (ref 98–107)
CO2 SERPL-SCNC: 24 MMOL/L (ref 21–32)
CREAT SERPL-MCNC: 1.2 MG/DL (ref 0.6–1.3)
GLUCOSE SERPL-MCNC: 93 MG/DL (ref 70–99)
HCT VFR BLD CALC: 46 % (ref 42–52)
HGB BLD-MCNC: 15.6 GM/DL (ref 14–18)
MAGNESIUM SERPL-MCNC: 2 MG/DL (ref 1.8–2.4)
MCH RBC QN AUTO: 33.9 PG (ref 26–34)
MCHC RBC AUTO-ENTMCNC: 34 G/DL (ref 28–37)
MCV RBC: 99.7 FL (ref 80–100)
MPV: 10.1 FL. (ref 7.2–11.1)
PLATELET COUNT*: 214 THOU/UL (ref 150–400)
POTASSIUM SERPL-SCNC: 4.3 MMOL/L (ref 3.5–5.1)
PROT SERPL-MCNC: 7 G/DL (ref 6.4–8.2)
RBC # BLD AUTO: 4.61 MIL/UL (ref 4.5–6)
RDW-CV: 13 % (ref 10.5–14.5)
SODIUM SERPL-SCNC: 136 MMOL/L (ref 136–145)
TROPONIN-I LEVEL: 26.78 NG/ML (ref ?–0.06)
WBC # BLD AUTO: 9.6 THOU/UL (ref 4–11)

## 2021-05-13 VITALS — DIASTOLIC BLOOD PRESSURE: 60 MMHG | SYSTOLIC BLOOD PRESSURE: 112 MMHG

## 2021-05-13 VITALS — DIASTOLIC BLOOD PRESSURE: 70 MMHG | SYSTOLIC BLOOD PRESSURE: 101 MMHG

## 2021-05-13 VITALS — SYSTOLIC BLOOD PRESSURE: 96 MMHG | DIASTOLIC BLOOD PRESSURE: 78 MMHG

## 2021-05-13 VITALS — DIASTOLIC BLOOD PRESSURE: 78 MMHG | SYSTOLIC BLOOD PRESSURE: 96 MMHG

## 2021-05-13 VITALS — DIASTOLIC BLOOD PRESSURE: 46 MMHG | SYSTOLIC BLOOD PRESSURE: 123 MMHG

## 2021-05-13 VITALS — SYSTOLIC BLOOD PRESSURE: 122 MMHG | DIASTOLIC BLOOD PRESSURE: 63 MMHG

## 2021-05-13 VITALS — DIASTOLIC BLOOD PRESSURE: 58 MMHG | SYSTOLIC BLOOD PRESSURE: 136 MMHG

## 2021-05-13 VITALS — DIASTOLIC BLOOD PRESSURE: 70 MMHG | SYSTOLIC BLOOD PRESSURE: 114 MMHG
